# Patient Record
Sex: FEMALE | Race: WHITE
[De-identification: names, ages, dates, MRNs, and addresses within clinical notes are randomized per-mention and may not be internally consistent; named-entity substitution may affect disease eponyms.]

---

## 2018-07-06 ENCOUNTER — HOSPITAL ENCOUNTER (OUTPATIENT)
Age: 63
End: 2018-07-06
Payer: COMMERCIAL

## 2018-07-06 DIAGNOSIS — D18.09: ICD-10-CM

## 2018-07-06 DIAGNOSIS — M54.14: Primary | ICD-10-CM

## 2018-07-06 DIAGNOSIS — M51.34: ICD-10-CM

## 2018-07-06 PROCEDURE — 72146 MRI CHEST SPINE W/O DYE: CPT

## 2018-07-06 NOTE — DI.MRI.S_ITS
PROCEDURE:  MR THORACIC SPINE WO CON  
   
INDICATIONS:  Thoracic radiculopathy  
   
TECHNIQUE:    
Noncontrast sagittal T1 spine echo and T2 fast spin echo, sagittal STIR, axial T1 and T2   
fast spin echo through the thoracic spine.    
   
COMPARISON:  [facility], MR, MR CERVICAL SPINE WITHOUT   
CONTRAST, 3/13/2018, 10:24.  
   
FINDINGS:    
Image quality:  Excellent.    
   
Alignment and Curvature:  There is normal bony alignment except for slight levoscoliosis   
centered at the lower thoracic spine.    
   
Bone Marrow:  Marrow is of normal overall signal except at T2 where an ovoid centrally   
positioned vertebral body presumed hemangioma is again seen previously present on   
cervical MR scanning 3/13/18.  No acute vertebral body compression fractures.  There is   
mild to moderate degenerative disc disease along the thoracic spine, but no significant   
spinal or foraminal stenosis is associated.  Degenerative disc disease is most prominent   
at T8-9 with a mild degree of adjacent edema in the vertebral endplates and anterior   
subcortical marrow space as a result.  
   
Spinal Cord:  Visualized spinal cord is normal in size and signal.    
   
Paraspinous Soft Tissues:  No paravertebral masses.    
   
Miscellaneous:  On axial images, central canal and foramina appear widely patent at all   
scanned levels.    
   
IMPRESSION: Again noted is a T2 vertebral body hemangioma centrally positioned within the   
T2 marrow space, present on prior cervical MR scanning that included that area.  
   
Along the cervical and visualized thoracic spine no worsening degenerative changes are   
seen from the area included during cervical MR scanning 3/13/18.  No compression fracture   
is found.  There is a mild inflammatory component of degenerative disc disease with a   
slight degree of associated marrow edema adjacent to the T8-T9 anterior vertebral   
endplates.  
   
No disc herniation is seen, and no paravertebral mass or inflammatory process is found.    
No suspicion for discitis or osteomyelitis.  
   
   
   
Dictated by:  Juan Jose Marin M.D. on 7/06/2018 at 11:04       
Approved by:  Juan Jose Marin M.D. on 7/06/2018 at 11:11

## 2018-08-10 ENCOUNTER — HOSPITAL ENCOUNTER (OUTPATIENT)
Age: 63
End: 2018-08-10
Payer: COMMERCIAL

## 2018-08-10 DIAGNOSIS — M51.37: ICD-10-CM

## 2018-08-10 DIAGNOSIS — M48.061: ICD-10-CM

## 2018-08-10 DIAGNOSIS — M51.36: Primary | ICD-10-CM

## 2018-08-10 DIAGNOSIS — M48.07: ICD-10-CM

## 2018-08-10 DIAGNOSIS — M54.5: ICD-10-CM

## 2018-08-10 PROCEDURE — 72148 MRI LUMBAR SPINE W/O DYE: CPT

## 2018-08-10 NOTE — DI.MRI.S_ITS
PROCEDURE:  MR LUMBAR SPINE WO CON  
   
INDICATIONS:  LOW BACK PAIN  
   
TECHNIQUE:    
Noncontrast sagittal T1 spin echo and T2 fast echo, sagittal STIR, axial T1 and T2 fast   
spin echo through the lumbar spine.  In cases with scoliosis, additional coronal T2 fast   
spin echo may be performed.    
   
COMPARISON:  [facility], MR, MR THORACIC SPINE WO CON, 7/06/2018, 10:01.  
   
FINDINGS:    
Image quality:  Excellent.    
   
Alignment and Curvature:  S-shaped scoliotic curvature is seen on  images.  Minimal   
retrolisthesis is seen at L1-L2 and L2-L3.  There is minimal anterolisthesis seen at   
L5-S1.    
   
Bone Marrow:  Marrow is of normal overall signal.  No acute vertebral body compression   
fractures.    
   
Spinal Cord:  Conus medullaris terminates at the T12-L1 level.  Visualized cord   
demonstrates normal signal and size.    
   
Paraspinous Soft Tissues:  No paravertebral masses.  Left peripelvic renal cysts are   
incidentally noted.   
   
T12-L1: At least moderate loss of disc height and disc signal can be seen. Mild   
generalized disc bulge is seen.  Mild facet joint hypertrophy is seen.  There is moderate   
right-sided and no significant left-sided neural foraminal narrowing seen.  No   
significant central canal narrowing is seen.    
   
L1-L2: At least moderate loss of disc height and disc signal are seen.  Mild loss of disc   
height is seen. Loss of disc signal is seen.  There is mild to moderate right-sided and   
moderate left-sided neural narrowing seen. Mild central canal narrowing is seen.    
   
L2-L3:  Moderate loss of disc height is seen.  Loss of disc signal is seen.  Mild   
generalized disc bulge is seen.  Mild facet joint hypertrophy is seen.  There is mild   
right-sided and minimal left-sided neural foraminal narrowing seen.  Mild central canal   
narrowing is seen.    
   
L3-L4: Moderate loss of disc height is seen.  Loss of disc signal is seen.  Mild to   
moderate disc bulge is seen.  Moderate bilateral neural foraminal narrowing is seen, left   
worse than right. Mild central canal narrowing is seen.    
   
L4-L5:  Mild loss of disc height is seen. Loss of disc signal is seen.  Mild generalized   
disc bulge is seen.  Moderate facet joint hypertrophy is seen.  No significant neural   
foraminal narrowing is seen. Mild central canal narrowing is seen.    
   
L5-S1: Mild to moderate loss of disc height and disc signal are seen.  Moderate disc   
bulge is seen.  Moderate to prominent facet hypertrophy is seen.  There is moderate   
right-sided and no significant left-sided neural foraminal narrowing seen.  Minimal   
central canal narrowing is seen.  
   
   
   
IMPRESSION: Multiple levels of lumbar spine degenerative change are seen, including   
moderate bilateral neural foraminal narrowing at L3-L4.  
   
Dictated by: Coy Peña M.D. on 8/10/2018 at 9:22       
Approved by: Coy Peña M.D. on 8/10/2018 at 9:30

## 2018-08-29 ENCOUNTER — HOSPITAL ENCOUNTER (OUTPATIENT)
Dept: HOSPITAL 73 - RAD | Age: 63
End: 2018-08-29
Payer: COMMERCIAL

## 2018-08-29 VITALS
SYSTOLIC BLOOD PRESSURE: 124 MMHG | RESPIRATION RATE: 18 BRPM | HEART RATE: 59 BPM | OXYGEN SATURATION: 99 % | DIASTOLIC BLOOD PRESSURE: 66 MMHG

## 2018-08-29 VITALS
DIASTOLIC BLOOD PRESSURE: 60 MMHG | SYSTOLIC BLOOD PRESSURE: 102 MMHG | OXYGEN SATURATION: 100 % | HEART RATE: 64 BPM | RESPIRATION RATE: 18 BRPM

## 2018-08-29 VITALS
HEART RATE: 63 BPM | RESPIRATION RATE: 16 BRPM | DIASTOLIC BLOOD PRESSURE: 60 MMHG | OXYGEN SATURATION: 100 % | SYSTOLIC BLOOD PRESSURE: 103 MMHG

## 2018-08-29 VITALS
SYSTOLIC BLOOD PRESSURE: 110 MMHG | OXYGEN SATURATION: 99 % | RESPIRATION RATE: 18 BRPM | HEART RATE: 64 BPM | DIASTOLIC BLOOD PRESSURE: 60 MMHG

## 2018-08-29 VITALS
DIASTOLIC BLOOD PRESSURE: 59 MMHG | SYSTOLIC BLOOD PRESSURE: 98 MMHG | RESPIRATION RATE: 16 BRPM | OXYGEN SATURATION: 100 % | HEART RATE: 67 BPM

## 2018-08-29 VITALS
OXYGEN SATURATION: 99 % | DIASTOLIC BLOOD PRESSURE: 59 MMHG | RESPIRATION RATE: 16 BRPM | SYSTOLIC BLOOD PRESSURE: 106 MMHG | HEART RATE: 59 BPM

## 2018-08-29 VITALS
OXYGEN SATURATION: 99 % | RESPIRATION RATE: 16 BRPM | DIASTOLIC BLOOD PRESSURE: 55 MMHG | HEART RATE: 60 BPM | SYSTOLIC BLOOD PRESSURE: 99 MMHG

## 2018-08-29 VITALS
HEART RATE: 62 BPM | OXYGEN SATURATION: 100 % | DIASTOLIC BLOOD PRESSURE: 58 MMHG | RESPIRATION RATE: 16 BRPM | SYSTOLIC BLOOD PRESSURE: 90 MMHG

## 2018-08-29 VITALS
OXYGEN SATURATION: 100 % | DIASTOLIC BLOOD PRESSURE: 65 MMHG | SYSTOLIC BLOOD PRESSURE: 105 MMHG | HEART RATE: 60 BPM | RESPIRATION RATE: 16 BRPM

## 2018-08-29 VITALS
OXYGEN SATURATION: 99 % | HEART RATE: 64 BPM | SYSTOLIC BLOOD PRESSURE: 125 MMHG | DIASTOLIC BLOOD PRESSURE: 73 MMHG | TEMPERATURE: 96.9 F | RESPIRATION RATE: 20 BRPM

## 2018-08-29 VITALS
DIASTOLIC BLOOD PRESSURE: 57 MMHG | HEART RATE: 61 BPM | RESPIRATION RATE: 16 BRPM | SYSTOLIC BLOOD PRESSURE: 97 MMHG | OXYGEN SATURATION: 99 %

## 2018-08-29 VITALS
OXYGEN SATURATION: 100 % | HEART RATE: 60 BPM | DIASTOLIC BLOOD PRESSURE: 59 MMHG | RESPIRATION RATE: 16 BRPM | SYSTOLIC BLOOD PRESSURE: 113 MMHG

## 2018-08-29 VITALS
OXYGEN SATURATION: 99 % | DIASTOLIC BLOOD PRESSURE: 60 MMHG | SYSTOLIC BLOOD PRESSURE: 104 MMHG | RESPIRATION RATE: 16 BRPM | HEART RATE: 61 BPM

## 2018-08-29 DIAGNOSIS — M54.16: ICD-10-CM

## 2018-08-29 DIAGNOSIS — M51.36: Primary | ICD-10-CM

## 2018-08-29 PROCEDURE — 99152 MOD SED SAME PHYS/QHP 5/>YRS: CPT

## 2018-08-29 PROCEDURE — 64483 NJX AA&/STRD TFRM EPI L/S 1: CPT

## 2018-08-29 RX ADMIN — MIDAZOLAM HYDROCHLORIDE 5 MG: 1 INJECTION, SOLUTION INTRAMUSCULAR; INTRAVENOUS at 08:33

## 2018-08-29 RX ADMIN — BUPIVACAINE HYDROCHLORIDE 2 ML: 2.5 INJECTION, SOLUTION EPIDURAL; INFILTRATION; INTRACAUDAL; PERINEURAL at 08:45

## 2018-08-29 RX ADMIN — IOPAMIDOL 3 ML: 612 INJECTION, SOLUTION INTRATHECAL at 08:44

## 2018-08-29 RX ADMIN — DEXAMETHASONE SODIUM PHOSPHATE 20 MG: 10 INJECTION INTRAMUSCULAR; INTRAVENOUS at 08:45

## 2018-08-29 NOTE — P.PCN_ITS
"Procedures    
Date/Time    
Date of procedure: 08/29/18    
Time of procedure: 08:10    
General    
Procedure description:     
    
PROVIDER:   Al Marcus DO    
    
Operative Note    
    
PREOP DIAGNOSIS    
1.  FORAMINAL STENOSIS WITH LE SYMPTOMS,     
    
POST OP DIAGNOSIS    
1.  FORAMINAL STENOSIS WITH LE SYMPTOMS,     
    
PROCEDURES    
1.  FLUOROSCOPICALLY GUIDED CONTRAST CONTROLLED TRANSFORAMINAL EPIDURAL STEROID   
INJECTION - RIGHT L1/2 TFESI    
    
SURGEON: Al Marcus,     
    
INDICATIONS    
Leonor is referred by Dr. Lombardo for treatment of Foraminal Stenosis with   
right LE Symptoms    
    
FINDINGS    
Foraminal Nerve Root Compression secondary to disc disease and facet hypertrophy    
    
DESCRIPTION OF PROCEDURE    
Following denial of allergy and review of potential side effects and   
complications, including, but not necessarily limited to, infection, allergic   
reaction, local tissue breakdown, stroke, temporary or permanent nerve injury,   
paralysis, and possible death, the patient indicated that the patient   
understood and agreed to proceed.  An informed consent document was signed by   
the patient, witnessed by a nurse, and placed in the patient's chart.    
Additionally, other treatment options including medications, modalities, and   
physical therapy were reviewed with the patient.    
    
After review of previous anaesthesic history and IV conscious sedation the   
patient was deemed safe to proceed with todays procedure with IV conscious   
sedation as ASA class II designation. Safety time-out was performed to confirm   
patient ID, procedure to be performed and site of procedure. IV sedation was   
accomplished with a combination of 3mg was administered by the RN after DO order  
, titrated to patient comfort during the course of the procedure while the   
patient remained responsive to all verbal commands    
    
In the prone position following sterile prep and drape of the lumbar region,   
the right L1/2 posterior neuroforamen was identified fluoroscopically.  The   
skin was anesthetized via a 25-gauge 1.5-inch needle with 1% lidocaine   
solution.  At this point, a 25-gauge 3.5-inch spinal needle was atraumatically   
introduced and advanced under fluoroscopic guidance through the posterior right   
L1/2 neuroforamen to approximately the anterior aspect of the canal.  Depth was   
confirmed on lateral view.  Following negative aspiration, injection of   
approximately 1.5 cc of Isovue 200 under live fluoroscopy in the AP view   
confirmed excellent flow along the nerve root, into the epidural space without   
vascular or intrathecal uptake observed      
    
Radiological data, including multiple fluoroscopic views of the lumbosacral   
spine, reveal a spinal needle at the right L1/2 posterior neuroforamen.    
Subsequent views show flow of contrast material flowing superiorly and   
inferiorly along the nerve root confirming epidural flow.      
    
Subsequently, a test dose of 1.5 cc of 1% lidocaine solution was administered   
and patient was observed for two minutes for signs or symptoms of complications  
, including abdominal pain, shortness of breath, bilateral upper or lower   
extremity weakness, nausea and vomiting, prior to steroid injection.  At this   
point, a total of 3 cc or 20 mg of dexamethasone and 80mg Depo medrol was   
injected without incident.      
    
The patient tolerated the procedure well without signs or symptoms of   
complications prior to transfer to the recovery area continued monitoring   
without incident.      
    
The patient was then transferred to the recovery area where they were observed   
for an appropriate time after the injection.  The patient reported a VAS score   
of 7 prior to the procedure and a post-procedure VAS of 0.      
    
Total Fluoroscopy Time: 24.2 seconds       
Total Conscious Sedation Time: 24min    
    
POST OP I
[id number]|2018-08-29 11:07:00|2018-08-31 12:22:00|DI.RAD.S_ITS|[facility]|Imaging|[id number]|"PROCEDURE:  PAIN L INTERLAMINAR/CAUDAL INJ

## 2018-08-29 NOTE — PM.PROC.1
"Procedures
Date/Time
Date of procedure: 08/29/18
Time of procedure: 08:10
General
Procedure description: 

PROVIDER: Al Marcus DO

Operative Note

PREOP DIAGNOSIS
1.  FORAMINAL STENOSIS WITH LE SYMPTOMS, 

POST OP DIAGNOSIS
1.  FORAMINAL STENOSIS WITH LE SYMPTOMS, 

PROCEDURES
1.  FLUOROSCOPICALLY GUIDED CONTRAST CONTROLLED TRANSFORAMINAL EPIDURAL STEROID INJECTION - RIGHT L1/2 TFESI

SURGEON: Al Marcus, 

INDICATIONS
Leonor is referred by Dr. Lombardo for treatment of Foraminal Stenosis with right LE Symptoms

FINDINGS
Foraminal Nerve Root Compression secondary to disc disease and facet hypertrophy

DESCRIPTION OF PROCEDURE
Following denial of allergy and review of potential side effects and complications, including, but not necessarily limited to, infection, allergic reaction, local tissue breakdown, stroke, temporary or permanent nerve injury, paralysis, and possible 
death, the patient indicated that the patient understood and agreed to proceed.  An informed consent document was signed by the patient, witnessed by a nurse, and placed in the patient's chart.  Additionally, other treatment options including 
medications, modalities, and physical therapy were reviewed with the patient.

After review of previous anaesthesic history and IV conscious sedation the patient was deemed safe to proceed with todays procedure with IV conscious sedation as ASA class II designation. Safety time-out was performed to confirm patient ID, 
procedure to be performed and site of procedure. IV sedation was accomplished with a combination of 3mg was administered by the RN after DO order, titrated to patient comfort during the course of the procedure while the patient remained responsive 
to all verbal commands

In the prone position following sterile prep and drape of the lumbar region, the right L1/2 posterior neuroforamen was identified fluoroscopically.  The skin was anesthetized via a 25-gauge 1.5-inch needle with 1% lidocaine solution.  At this point, 
a 25-gauge 3.5-inch spinal needle was atraumatically introduced and advanced under fluoroscopic guidance through the posterior right L1/2 neuroforamen to approximately the anterior aspect of the canal.  Depth was confirmed on lateral view.  
Following negative aspiration, injection of approximately 1.5 cc of Isovue 200 under live fluoroscopy in the AP view confirmed excellent flow along the nerve root, into the epidural space without vascular or intrathecal uptake observed  

Radiological data, including multiple fluoroscopic views of the lumbosacral spine, reveal a spinal needle at the right L1/2 posterior neuroforamen.  Subsequent views show flow of contrast material flowing superiorly and inferiorly along the nerve 
root confirming epidural flow.  

Subsequently, a test dose of 1.5 cc of 1% lidocaine solution was administered and patient was observed for two minutes for signs or symptoms of complications, including abdominal pain, shortness of breath, bilateral upper or lower extremity 
weakness, nausea and vomiting, prior to steroid injection.  At this point, a total of 3 cc or 20 mg of dexamethasone and 80mg Depo medrol was injected without incident.  

The patient tolerated the procedure well without signs or symptoms of complications prior to transfer to the recovery area continued monitoring without incident.  

The patient was then transferred to the recovery area where they were observed for an appropriate time after the injection.  The patient reported a VAS score of 7 prior to the procedure and a post-procedure VAS of 0.  

Total Fluoroscopy Time: 24.2 seconds   
Total Conscious Sedation Time: 24min

POST OP INSTRUCTIONS
The patient was provided a Pain Log to continue to record their response to the target-specific procedure prior to follow-up visit with their referring physician. Additionally, specific post-injection care instructions and a contact number to our 
office were provided if concerns arise regarding pos
[id number]|2018-08-29 09:30:48|2018-08-29 09:30:48|PC.NURSE||||"Continues weak on right leg. Stays for observation"

## 2018-09-21 ENCOUNTER — HOSPITAL ENCOUNTER (OUTPATIENT)
Age: 63
End: 2018-09-21
Payer: COMMERCIAL

## 2018-09-21 DIAGNOSIS — M48.56XA: ICD-10-CM

## 2018-09-21 DIAGNOSIS — M43.16: ICD-10-CM

## 2018-09-21 DIAGNOSIS — M51.26: Primary | ICD-10-CM

## 2018-09-21 DIAGNOSIS — M47.816: ICD-10-CM

## 2018-09-21 DIAGNOSIS — M51.36: ICD-10-CM

## 2018-09-21 PROCEDURE — 72110 X-RAY EXAM L-2 SPINE 4/>VWS: CPT

## 2018-09-21 NOTE — DI.RAD.S_ITS
PROCEDURE:  XR LUMBAR SPINE MIN 4V  
   
INDICATIONS:  eval  
   
TECHNIQUE: 5 views of the lumbar spine were acquired.    
   
COMPARISON:  None.  
   
FINDINGS:    
   
Bones: There is L2 compression fracture with minimal height loss. Diffuse endplate   
spurring and sclerosis is seen. No other vertebral body fractures identified. Lower   
lumbar spine facet arthropathy. Grade 1 retrolisthesis of L1 on L2 and trace   
retrolisthesis of L3 on L4. Grade 1 anterolisthesis of L5 on S1. Moderate narrowing of   
the L5-S1 disc space as well as the L1-L2 and L2-L3 disc spaces. Bilateral mild hip joint   
degeneration. There is dextrocurvature centered at L3  
   
Soft tissues:  Overlying bowel gas pattern is normal.  No suspicious soft tissue   
calcifications.    
   
Oblique images:  No pars defects although evaluation is suboptimal due to arthritic   
changes.  
   
IMPRESSION:  
   
Mild L1 compression fracture.  
   
Diffuse multilevel lumbar disc degeneration as above.  
   
Grade 1 retrolisthesis of L1-L2.  
   
Lower lumbar facet arthropathy.  
   
   
   
Dictated by: Reinaldo Jimenez M.D. on 9/21/2018 at 14:24       
Approved by: Reinaldo Jimenez M.D. on 9/21/2018 at 14:27

## 2018-10-03 ENCOUNTER — HOSPITAL ENCOUNTER (OUTPATIENT)
Dept: HOSPITAL 73 - RAD | Age: 63
End: 2018-10-03
Payer: COMMERCIAL

## 2018-10-03 VITALS
DIASTOLIC BLOOD PRESSURE: 82 MMHG | SYSTOLIC BLOOD PRESSURE: 116 MMHG | HEART RATE: 75 BPM | RESPIRATION RATE: 18 BRPM | OXYGEN SATURATION: 98 %

## 2018-10-03 VITALS
SYSTOLIC BLOOD PRESSURE: 101 MMHG | OXYGEN SATURATION: 98 % | DIASTOLIC BLOOD PRESSURE: 67 MMHG | RESPIRATION RATE: 20 BRPM | HEART RATE: 80 BPM

## 2018-10-03 VITALS
DIASTOLIC BLOOD PRESSURE: 80 MMHG | HEART RATE: 70 BPM | OXYGEN SATURATION: 100 % | RESPIRATION RATE: 20 BRPM | TEMPERATURE: 96.9 F | SYSTOLIC BLOOD PRESSURE: 125 MMHG

## 2018-10-03 VITALS
RESPIRATION RATE: 16 BRPM | DIASTOLIC BLOOD PRESSURE: 98 MMHG | HEART RATE: 71 BPM | OXYGEN SATURATION: 100 % | SYSTOLIC BLOOD PRESSURE: 137 MMHG

## 2018-10-03 VITALS
HEART RATE: 74 BPM | SYSTOLIC BLOOD PRESSURE: 115 MMHG | RESPIRATION RATE: 18 BRPM | DIASTOLIC BLOOD PRESSURE: 75 MMHG | OXYGEN SATURATION: 100 %

## 2018-10-03 VITALS
SYSTOLIC BLOOD PRESSURE: 104 MMHG | OXYGEN SATURATION: 98 % | RESPIRATION RATE: 20 BRPM | DIASTOLIC BLOOD PRESSURE: 61 MMHG | HEART RATE: 75 BPM

## 2018-10-03 VITALS
HEART RATE: 76 BPM | RESPIRATION RATE: 20 BRPM | DIASTOLIC BLOOD PRESSURE: 64 MMHG | OXYGEN SATURATION: 99 % | SYSTOLIC BLOOD PRESSURE: 98 MMHG

## 2018-10-03 VITALS
OXYGEN SATURATION: 98 % | SYSTOLIC BLOOD PRESSURE: 106 MMHG | DIASTOLIC BLOOD PRESSURE: 65 MMHG | HEART RATE: 89 BPM | RESPIRATION RATE: 20 BRPM

## 2018-10-03 VITALS
DIASTOLIC BLOOD PRESSURE: 76 MMHG | HEART RATE: 72 BPM | OXYGEN SATURATION: 100 % | SYSTOLIC BLOOD PRESSURE: 110 MMHG | RESPIRATION RATE: 16 BRPM

## 2018-10-03 VITALS
DIASTOLIC BLOOD PRESSURE: 62 MMHG | HEART RATE: 76 BPM | OXYGEN SATURATION: 98 % | SYSTOLIC BLOOD PRESSURE: 104 MMHG | RESPIRATION RATE: 18 BRPM

## 2018-10-03 VITALS
SYSTOLIC BLOOD PRESSURE: 105 MMHG | OXYGEN SATURATION: 99 % | HEART RATE: 67 BPM | DIASTOLIC BLOOD PRESSURE: 62 MMHG | RESPIRATION RATE: 16 BRPM

## 2018-10-03 VITALS
SYSTOLIC BLOOD PRESSURE: 101 MMHG | DIASTOLIC BLOOD PRESSURE: 65 MMHG | HEART RATE: 75 BPM | RESPIRATION RATE: 16 BRPM | OXYGEN SATURATION: 99 %

## 2018-10-03 DIAGNOSIS — M48.061: Primary | ICD-10-CM

## 2018-10-03 DIAGNOSIS — M51.16: ICD-10-CM

## 2018-10-03 PROCEDURE — 64483 NJX AA&/STRD TFRM EPI L/S 1: CPT

## 2018-10-03 PROCEDURE — 99152 MOD SED SAME PHYS/QHP 5/>YRS: CPT

## 2018-10-03 RX ADMIN — IOPAMIDOL 3 ML: 612 INJECTION, SOLUTION INTRATHECAL at 11:59

## 2018-10-03 RX ADMIN — DEXAMETHASONE SODIUM PHOSPHATE 20 MG: 10 INJECTION INTRAMUSCULAR; INTRAVENOUS at 11:59

## 2018-10-03 RX ADMIN — MIDAZOLAM HYDROCHLORIDE 5 MG: 1 INJECTION, SOLUTION INTRAMUSCULAR; INTRAVENOUS at 11:51

## 2018-10-03 RX ADMIN — BUPIVACAINE HYDROCHLORIDE 2 ML: 2.5 INJECTION, SOLUTION EPIDURAL; INFILTRATION; INTRACAUDAL; PERINEURAL at 11:58

## 2018-10-03 NOTE — DI.RAD.S_ITS
PROCEDURE:  PAIN L/S TRANSFORAMINAL INJECT  
   
INDICATIONS:  INTERVERTEBRAL DISC DISPLACEMENT  
   
FINDINGS:    
Fluoroscopic spot filming was performed to verify placement of spinal needles at the   
right L3-4 intervertebral neural foramen, as labeled on the films.  Appropriate   
location(s) of the needle tip(s) was confirmed by injection of iodinated contrast.    
   
IMPRESSION: Successful needle tip localization for transforaminal epidural steroid   
injection on the right at the L3-4 neural foramen.  
   
   
   
Dictated by: Juan Jose Marin M.D. on 10/03/2018 at 15:41       
Approved by: Juan Jose Marin M.D. on 10/03/2018 at 15:41

## 2018-10-03 NOTE — PM.PROC.1
"Procedures
Date/Time
Date of procedure: 10/03/18
Time of procedure: 12:10
General
Procedure description: 

PROVIDER: Al Marcus DO

Operative Note

PREOP DIAGNOSIS
1.  FORAMINAL STENOSIS WITH LE SYMPTOMS, 

POST OP DIAGNOSIS
1.  FORAMINAL STENOSIS WITH LE SYMPTOMS, 

PROCEDURES
1.  FLUOROSCOPICALLY GUIDED CONTRAST CONTROLLED TRANSFORAMINAL EPIDURAL STEROID INJECTION - RIGHT L3/4 TFESI

SURGEON: Al Marcus, 

INDICATIONS
Leonor is referred by Dr. Lombardo for treatment of Foraminal Stenosis with right LE Symptoms

FINDINGS
Foraminal Nerve Root Compression secondary to disc disease and facet hypertrophy

DESCRIPTION OF PROCEDURE
Following denial of allergy and review of potential side effects and complications, including, but not necessarily limited to, infection, allergic reaction, local tissue breakdown, stroke, temporary or permanent nerve injury, paralysis, and possible 
death, the patient indicated that the patient understood and agreed to proceed.  An informed consent document was signed by the patient, witnessed by a nurse, and placed in the patient's chart.  Additionally, other treatment options including 
medications, modalities, and physical therapy were reviewed with the patient.

After review of previous anaesthesic history and IV conscious sedation the patient was deemed safe to proceed with todays procedure with IV conscious sedation as ASA class II designation. Safety time-out was performed to confirm patient ID, 
procedure to be performed and site of procedure. IV sedation was accomplished with a combination of 3mg was administered by the RN after DO order, titrated to patient comfort during the course of the procedure while the patient remained responsive 
to all verbal commands

In the prone position following sterile prep and drape of the lumbar region, the right L3/4 posterior neuroforamen was identified fluoroscopically.  The skin was anesthetized via a 25-gauge 1.5-inch needle with 1% lidocaine solution.  At this point, 
a 25-gauge 3.5-inch spinal needle was atraumatically introduced and advanced under fluoroscopic guidance through the posterior right L3/4 neuroforamen to approximately the anterior aspect of the canal.  Depth was confirmed on lateral view.  
Following negative aspiration, injection of approximately 1.5 cc of Isovue 200 under live fluoroscopy in the AP view confirmed excellent flow along the nerve root, into the epidural space without vascular or intrathecal uptake observed  

Radiological data, including multiple fluoroscopic views of the lumbosacral spine, reveal a spinal needle at the right L3/4 posterior neuroforamen.  Subsequent views show flow of contrast material flowing superiorly and inferiorly along the nerve 
root confirming epidural flow.  

Subsequently, a test dose of 1.5 cc of 1% lidocaine solution was administered and patient was observed for two minutes for signs or symptoms of complications, including abdominal pain, shortness of breath, bilateral upper or lower extremity 
weakness, nausea and vomiting, prior to steroid injection.  At this point, a total of 3 cc or 20 mg of dexamethasone and 80mg Depo medrol was injected without incident.  

The patient tolerated the procedure well without signs or symptoms of complications prior to transfer to the recovery area continued monitoring without incident.  
The patient was then transferred to the recovery area where they were observed for an appropriate time after the injection.  The patient reported a VAS score of 7 prior to the procedure and a post-procedure VAS of 0.  

Total Fluoroscopy Time: 24.2 seconds   
Total Conscious Sedation Time: 24min

POST OP INSTRUCTIONS
The patient was provided a Pain Log to continue to record their response to the target-specific procedure prior to follow-up visit with their referring physician. Additionally, specific post-injection care instructions and a contact number to our 
office were provided if concerns arise regarding possi
[id number]|2018-10-03 12:12:00|2018-10-03 12:10:00|ROCÍO_ITS|[facility]|Health Information Management|[id number]|"Procedures

## 2019-03-26 ENCOUNTER — HOSPITAL ENCOUNTER (OUTPATIENT)
Dept: HOSPITAL 73 - RAD | Age: 64
End: 2019-03-26
Payer: COMMERCIAL

## 2019-03-26 ENCOUNTER — HOSPITAL ENCOUNTER (OUTPATIENT)
Age: 64
End: 2019-03-26
Payer: COMMERCIAL

## 2019-03-26 VITALS
DIASTOLIC BLOOD PRESSURE: 81 MMHG | HEART RATE: 73 BPM | SYSTOLIC BLOOD PRESSURE: 111 MMHG | OXYGEN SATURATION: 97 % | RESPIRATION RATE: 16 BRPM

## 2019-03-26 VITALS
DIASTOLIC BLOOD PRESSURE: 70 MMHG | OXYGEN SATURATION: 97 % | HEART RATE: 74 BPM | SYSTOLIC BLOOD PRESSURE: 115 MMHG | RESPIRATION RATE: 18 BRPM

## 2019-03-26 VITALS
DIASTOLIC BLOOD PRESSURE: 65 MMHG | RESPIRATION RATE: 18 BRPM | HEART RATE: 69 BPM | OXYGEN SATURATION: 97 % | SYSTOLIC BLOOD PRESSURE: 111 MMHG

## 2019-03-26 VITALS
DIASTOLIC BLOOD PRESSURE: 57 MMHG | OXYGEN SATURATION: 98 % | RESPIRATION RATE: 16 BRPM | HEART RATE: 71 BPM | SYSTOLIC BLOOD PRESSURE: 99 MMHG

## 2019-03-26 VITALS
SYSTOLIC BLOOD PRESSURE: 119 MMHG | OXYGEN SATURATION: 99 % | DIASTOLIC BLOOD PRESSURE: 71 MMHG | HEART RATE: 74 BPM | RESPIRATION RATE: 18 BRPM

## 2019-03-26 VITALS
HEART RATE: 71 BPM | OXYGEN SATURATION: 96 % | RESPIRATION RATE: 18 BRPM | SYSTOLIC BLOOD PRESSURE: 128 MMHG | DIASTOLIC BLOOD PRESSURE: 76 MMHG | TEMPERATURE: 99.14 F

## 2019-03-26 VITALS
HEART RATE: 72 BPM | SYSTOLIC BLOOD PRESSURE: 113 MMHG | RESPIRATION RATE: 18 BRPM | DIASTOLIC BLOOD PRESSURE: 64 MMHG | OXYGEN SATURATION: 98 %

## 2019-03-26 VITALS
SYSTOLIC BLOOD PRESSURE: 104 MMHG | RESPIRATION RATE: 16 BRPM | OXYGEN SATURATION: 99 % | DIASTOLIC BLOOD PRESSURE: 55 MMHG | HEART RATE: 67 BPM

## 2019-03-26 VITALS
HEART RATE: 81 BPM | SYSTOLIC BLOOD PRESSURE: 109 MMHG | DIASTOLIC BLOOD PRESSURE: 65 MMHG | OXYGEN SATURATION: 99 % | RESPIRATION RATE: 18 BRPM

## 2019-03-26 DIAGNOSIS — Z80.3: ICD-10-CM

## 2019-03-26 DIAGNOSIS — M51.16: ICD-10-CM

## 2019-03-26 DIAGNOSIS — M48.061: Primary | ICD-10-CM

## 2019-03-26 DIAGNOSIS — Z12.31: Primary | ICD-10-CM

## 2019-03-26 PROCEDURE — 77067 SCR MAMMO BI INCL CAD: CPT

## 2019-03-26 PROCEDURE — 64483 NJX AA&/STRD TFRM EPI L/S 1: CPT

## 2019-03-26 PROCEDURE — 77063 BREAST TOMOSYNTHESIS BI: CPT

## 2019-03-26 PROCEDURE — 99152 MOD SED SAME PHYS/QHP 5/>YRS: CPT

## 2019-03-26 RX ADMIN — MIDAZOLAM HYDROCHLORIDE 5 MG: 1 INJECTION, SOLUTION INTRAMUSCULAR; INTRAVENOUS at 14:57

## 2019-03-26 RX ADMIN — BUPIVACAINE HYDROCHLORIDE 2 ML: 2.5 INJECTION, SOLUTION EPIDURAL; INFILTRATION; INTRACAUDAL; PERINEURAL at 15:01

## 2019-03-26 RX ADMIN — IOPAMIDOL 3 ML: 612 INJECTION, SOLUTION INTRATHECAL at 15:02

## 2019-03-26 RX ADMIN — DEXAMETHASONE SODIUM PHOSPHATE 20 MG: 10 INJECTION INTRAMUSCULAR; INTRAVENOUS at 15:02

## 2019-03-26 NOTE — DI.MG.S_ITS
BILATERAL DIGITAL SCREENING MAMMOGRAM 3D/2D WITH CAD: 3/26/2019  
CLINICAL: Routine screening. Family history of breast cancer.    
   
Comparison is made to exams dated:  3/12/2018 mammogram, 2/14/2017 mammogram, and   
1/18/2016 mammogram - [facility].  There are scattered fibroglandular elements in   
both breasts.    
   
Current study was also evaluated with a Computer Aided Detection (CAD) system.    
No significant masses, calcifications, or other findings are seen in either breast.    
There are linear scar markers overlying the breasts bilaterally.  
There has been no significant interval change.  
   
IMPRESSION: NEGATIVE  
There is no mammographic evidence of malignancy. A 1 year screening mammogram is   
recommended.     
   
This exam was interpreted at Station ID: [id number].    
   
NOTE: For mammograms, a report in lay terms will be sent to the patient. Approximately   
15% of breast malignancies will not be visualized mammographically. In the management of   
a palpable breast mass, a negative mammogram must not discourage biopsy of a clinically   
suspicious lesion.  
   
Electronically Signed By: Dom Foreman M.D.            
ecl/:3/26/2019 17:38:44    
   
   
letter sent: Normal Exam    
ACR BI-RADS Category 1: Negative [id number]

## 2019-03-26 NOTE — PM.PROC.1
"Procedures
Date/Time
Date of procedure: 03/26/19
Time of procedure: 15:10
General
Procedure description: 

PROVIDER: Al Marcus DO

Operative Note

PREOP DIAGNOSIS
1.  FORAMINAL STENOSIS WITH LE SYMPTOMS, 

POST OP DIAGNOSIS
1.  FORAMINAL STENOSIS WITH LE SYMPTOMS, 

PROCEDURES
1.  FLUOROSCOPICALLY GUIDED CONTRAST CONTROLLED TRANSFORAMINAL EPIDURAL STEROID INJECTION - LEFT L3/4 TFESI

SURGEON: Al Marcus, 

INDICATIONS
Leonor is referred by Dr. Grover for treatment of Foraminal Stenosis with left LE Symptoms

FINDINGS
Foraminal Nerve Root Compression secondary to disc disease and facet hypertrophy.

DESCRIPTION OF PROCEDURE
Following denial of allergy and review of potential side effects and complications, including, but not necessarily limited to, infection, allergic reaction, local tissue breakdown, stroke, temporary or permanent nerve injury, paralysis, and possible 
death, the patient indicated that the patient understood and agreed to proceed.  An informed consent document was signed by the patient, witnessed by a nurse, and placed in the patient's chart.  Additionally, other treatment options including 
medications, modalities, and physical therapy were reviewed with the patient.

After review of previous anaesthesic history and IV conscious sedation the patient was deemed safe to proceed with todays procedure with IV conscious sedation as ASA class II designation. Safety time-out was performed to confirm patient ID, 
procedure to be performed and site of procedure. IV sedation was accomplished with a combination of 3mg of Versed was administered by the RN after DO order, titrated to patient comfort during the course of the procedure while the patient remained 
responsive to all verbal commands

In the prone position following sterile prep and drape of the lumbar region, the left L3/4 posterior neuroforamen was identified fluoroscopically.  The skin was anesthetized via a 25-gauge 1.5-inch needle with 1% lidocaine solution.  At this point, 
a 25-gauge 3.5-inch spinal needle was atraumatically introduced and advanced under fluoroscopic guidance through the posterior left L3/4 neuroforamen to approximately the anterior aspect of the canal.  Depth was confirmed on lateral view.  Following 
negative aspiration, injection of approximately 1.5 cc of Isovue 200 under live fluoroscopy in the AP view confirmed excellent flow along the nerve root, into the epidural space without vascular or intrathecal uptake observed  

Radiological data, including multiple fluoroscopic views of the lumbosacral spine, reveal a spinal needle at the left L3/4 posterior neuroforamen.  Subsequent views show flow of contrast material flowing superiorly and inferiorly along the nerve 
root confirming epidural flow.  

Subsequently, a test dose of 1.5 cc of 1% lidocaine solution was administered and patient was observed for two minutes for signs or symptoms of complications, including abdominal pain, shortness of breath, bilateral upper or lower extremity 
weakness, nausea and vomiting, prior to steroid injection.  At this point, a total of 2cc or 20mg of dexamethasone was injected without incident.  

The patient tolerated the procedure well without signs or symptoms of complications prior to transfer to the recovery area continued monitoring without incident.  
The patient was then transferred to the recovery area where they were observed for an appropriate time after the injection.  The patient reported a VAS score of 7 prior to the procedure and a post-procedure VAS of 0.  

Total Fluoroscopy Time: 24.2 seconds   
Total Conscious Sedation Time: 24min

POST OP INSTRUCTIONS
The patient was provided a Pain Log to continue to record their response to the target-specific procedure prior to follow-up visit with their referring physician. Additionally, specific post-injection care instructions and a contact number to our 
office were provided if concerns arise regarding possible complications
[id number]|2019-03-26 15:29:51|2019-03-26 15:29:51|PC.NURSE||||"pt returned via wheelchair awake and alert.  Able to move from w/c to chair with standby assist.  Resumed monitoring from Danica CROWELL. "

## 2019-03-26 NOTE — P.PCN_ITS
"Procedures    
Date/Time    
Date of procedure: 03/26/19    
Time of procedure: 15:10    
General    
Procedure description:     
    
PROVIDER:   Al Marcus DO    
    
Operative Note    
    
PREOP DIAGNOSIS    
1.  FORAMINAL STENOSIS WITH LE SYMPTOMS,     
    
POST OP DIAGNOSIS    
1.  FORAMINAL STENOSIS WITH LE SYMPTOMS,     
    
PROCEDURES    
1.  FLUOROSCOPICALLY GUIDED CONTRAST CONTROLLED TRANSFORAMINAL EPIDURAL STEROID   
INJECTION - LEFT L3/4 TFESI    
    
SURGEON: Al Marcus,     
    
INDICATIONS    
Leonor is referred by Dr. Grover for treatment of Foraminal Stenosis with left   
LE Symptoms    
    
FINDINGS    
Foraminal Nerve Root Compression secondary to disc disease and facet   
hypertrophy.    
    
DESCRIPTION OF PROCEDURE    
Following denial of allergy and review of potential side effects and   
complications, including, but not necessarily limited to, infection, allergic   
reaction, local tissue breakdown, stroke, temporary or permanent nerve injury,   
paralysis, and possible death, the patient indicated that the patient understood  
and agreed to proceed.  An informed consent document was signed by the patient,   
witnessed by a nurse, and placed in the patient's chart.  Additionally, other   
treatment options including medications, modalities, and physical therapy were   
reviewed with the patient.    
    
After review of previous anaesthesic history and IV conscious sedation the   
patient was deemed safe to proceed with todays procedure with IV conscious   
sedation as ASA class II designation. Safety time-out was performed to confirm   
patient ID, procedure to be performed and site of procedure. IV sedation was   
accomplished with a combination of 3mg of Versed was administered by the RN   
after DO order, titrated to patient comfort during the course of the procedure   
while the patient remained responsive to all verbal commands    
    
In the prone position following sterile prep and drape of the lumbar region, the  
left L3/4 posterior neuroforamen was identified fluoroscopically.  The skin was   
anesthetized via a 25-gauge 1.5-inch needle with 1% lidocaine solution.  At this  
point, a 25-gauge 3.5-inch spinal needle was atraumatically introduced and   
advanced under fluoroscopic guidance through the posterior left L3/4   
neuroforamen to approximately the anterior aspect of the canal.  Depth was   
confirmed on lateral view.  Following negative aspiration, injection of   
approximately 1.5 cc of Isovue 200 under live fluoroscopy in the AP view   
confirmed excellent flow along the nerve root, into the epidural space without   
vascular or intrathecal uptake observed      
    
Radiological data, including multiple fluoroscopic views of the lumbosacral   
spine, reveal a spinal needle at the left L3/4 posterior neuroforamen.    
Subsequent views show flow of contrast material flowing superiorly and   
inferiorly along the nerve root confirming epidural flow.      
    
Subsequently, a test dose of 1.5 cc of 1% lidocaine solution was administered   
and patient was observed for two minutes for signs or symptoms of complications,  
including abdominal pain, shortness of breath, bilateral upper or lower   
extremity weakness, nausea and vomiting, prior to steroid injection.  At this   
point, a total of 2cc or 20mg of dexamethasone was injected without incident.      
    
The patient tolerated the procedure well without signs or symptoms of   
complications prior to transfer to the recovery area continued monitoring   
without incident.      
The patient was then transferred to the recovery area where they were observed   
for an appropriate time after the injection.  The patient reported a VAS score   
of 7 prior to the procedure and a post-procedure VAS of 0.      
    
Total Fluoroscopy Time: 24.2 seconds       
Total Conscious Sedation Time: 24min    
    
POST OP INSTRUCTIONS    
The [facility]ryan
[id number]|2019-03-26 10:47:00|2019-03-26 16:36:00|DI.RAD.S_ITS|[facility]|Imaging|[id number]|"PROCEDURE:  PAIN L/S TRANSFORAMINAL INJECT

## 2019-03-27 NOTE — PC.NURSE
Follow up call made and pt reports doing well today, said she slept most of yesterday after procedure, but was also able to sleep through the night.  She still has a little  hiccup  in her left hip if she moves just the right way. Otherwise she is 
doing great.

## 2020-01-01 NOTE — DI.RAD.S_ITS
PROCEDURE:  PAIN L/S TRANSFORAMINAL INJECT  
   
INDICATIONS:  HNP with right lower extremity radicular  
   
FINDINGS:    
Fluoroscopic spot filming was performed to verify placement of spinal needles at the   
L1-L2 level(s), as labeled on the films.  Appropriate location(s) of the needle tip(s)   
was confirmed by injection of iodinated contrast.    
   
IMPRESSION: Successful transforaminal epidural steroid injection localization position.  
   
   
   
Dictated by:  Juan Jose Marin M.D. on 8/31/2018 at 11:35       
Approved by:  Juan Jose Marin M.D. on 8/31/2018 at 11:50 PILO

## 2021-01-13 ENCOUNTER — HOSPITAL ENCOUNTER (OUTPATIENT)
Age: 66
End: 2021-01-13
Payer: COMMERCIAL

## 2021-01-13 DIAGNOSIS — M51.04: ICD-10-CM

## 2021-01-13 DIAGNOSIS — M48.02: ICD-10-CM

## 2021-01-13 DIAGNOSIS — M54.14: ICD-10-CM

## 2021-01-13 DIAGNOSIS — M41.80: ICD-10-CM

## 2021-01-13 DIAGNOSIS — M41.84: Primary | ICD-10-CM

## 2021-01-13 DIAGNOSIS — M51.34: ICD-10-CM

## 2021-01-13 DIAGNOSIS — M51.26: ICD-10-CM

## 2021-01-13 PROCEDURE — 72072 X-RAY EXAM THORAC SPINE 3VWS: CPT

## 2021-01-13 PROCEDURE — 99214 OFFICE O/P EST MOD 30 MIN: CPT

## 2021-01-13 NOTE — DI.RAD.S_ITS
PROCEDURE:  XR THORACIC SPINE 3V  
   
INDICATIONS:  thoracic radiculopathy  
   
TECHNIQUE:  3 views of the thoracic spine were acquired.    
   
COMPARISON:  None.  
   
FINDINGS:    
   
Bones:  No fractures or dislocations.  No suspicious bony lesions.  There is   
mild-to-moderate kyphosis centered at T7-8 level.  Mild to moderate leftward scoliosis of   
lower thoracic spine centered at T10-11 level is also seen.  Degenerative endplate   
changes are noted throughout mid to lower thoracic spine.  12 pairs of ribs are noted,   
and appear intact where visualized.    
   
Soft tissues:  No paravertebral stripe thickening.    
   
IMPRESSION:  Scoliosis and kyphosis of thoracic spine as above.  No acute compression   
fracture or spondylolisthesis.  Degenerative disc disease throughout mid to lower   
thoracic spine.  
   
   
Dictated by: Carlos Eduardo Caban M.D. on 1/13/2021 at 13:35       
Approved by: Carlos Eduardo Caban M.D. on 1/13/2021 at 13:47

## 2021-02-01 ENCOUNTER — HOSPITAL ENCOUNTER (OUTPATIENT)
Age: 66
End: 2021-02-01
Payer: COMMERCIAL

## 2021-02-01 DIAGNOSIS — Z20.822: Primary | ICD-10-CM

## 2021-02-01 PROCEDURE — 87635 SARS-COV-2 COVID-19 AMP PRB: CPT

## 2021-02-02 ENCOUNTER — HOSPITAL ENCOUNTER (OUTPATIENT)
Dept: HOSPITAL 73 - RAD | Age: 66
Discharge: HOME | End: 2021-02-02
Payer: COMMERCIAL

## 2021-02-02 VITALS
DIASTOLIC BLOOD PRESSURE: 64 MMHG | HEART RATE: 58 BPM | OXYGEN SATURATION: 98 % | RESPIRATION RATE: 12 BRPM | SYSTOLIC BLOOD PRESSURE: 125 MMHG

## 2021-02-02 VITALS
HEART RATE: 60 BPM | SYSTOLIC BLOOD PRESSURE: 110 MMHG | OXYGEN SATURATION: 95 % | RESPIRATION RATE: 18 BRPM | DIASTOLIC BLOOD PRESSURE: 60 MMHG

## 2021-02-02 VITALS
HEART RATE: 57 BPM | RESPIRATION RATE: 13 BRPM | SYSTOLIC BLOOD PRESSURE: 105 MMHG | DIASTOLIC BLOOD PRESSURE: 58 MMHG | OXYGEN SATURATION: 95 %

## 2021-02-02 VITALS
RESPIRATION RATE: 12 BRPM | SYSTOLIC BLOOD PRESSURE: 104 MMHG | OXYGEN SATURATION: 96 % | DIASTOLIC BLOOD PRESSURE: 57 MMHG | HEART RATE: 57 BPM

## 2021-02-02 VITALS
HEART RATE: 62 BPM | DIASTOLIC BLOOD PRESSURE: 62 MMHG | RESPIRATION RATE: 18 BRPM | SYSTOLIC BLOOD PRESSURE: 106 MMHG | OXYGEN SATURATION: 95 %

## 2021-02-02 VITALS
RESPIRATION RATE: 12 BRPM | OXYGEN SATURATION: 95 % | SYSTOLIC BLOOD PRESSURE: 105 MMHG | DIASTOLIC BLOOD PRESSURE: 57 MMHG | HEART RATE: 56 BPM

## 2021-02-02 VITALS
HEART RATE: 65 BPM | DIASTOLIC BLOOD PRESSURE: 60 MMHG | OXYGEN SATURATION: 98 % | RESPIRATION RATE: 18 BRPM | SYSTOLIC BLOOD PRESSURE: 128 MMHG | TEMPERATURE: 97.88 F

## 2021-02-02 VITALS
DIASTOLIC BLOOD PRESSURE: 54 MMHG | OXYGEN SATURATION: 98 % | RESPIRATION RATE: 20 BRPM | TEMPERATURE: 97 F | SYSTOLIC BLOOD PRESSURE: 108 MMHG | HEART RATE: 58 BPM

## 2021-02-02 VITALS
RESPIRATION RATE: 16 BRPM | DIASTOLIC BLOOD PRESSURE: 51 MMHG | OXYGEN SATURATION: 94 % | SYSTOLIC BLOOD PRESSURE: 101 MMHG | HEART RATE: 58 BPM

## 2021-02-02 VITALS
HEART RATE: 58 BPM | SYSTOLIC BLOOD PRESSURE: 128 MMHG | DIASTOLIC BLOOD PRESSURE: 60 MMHG | OXYGEN SATURATION: 100 % | RESPIRATION RATE: 18 BRPM

## 2021-02-02 DIAGNOSIS — M47.24: ICD-10-CM

## 2021-02-02 DIAGNOSIS — M51.14: ICD-10-CM

## 2021-02-02 DIAGNOSIS — M48.04: Primary | ICD-10-CM

## 2021-02-02 PROCEDURE — 99152 MOD SED SAME PHYS/QHP 5/>YRS: CPT

## 2021-02-02 PROCEDURE — 62321 NJX INTERLAMINAR CRV/THRC: CPT

## 2021-02-02 RX ADMIN — FENTANYL CITRATE 50 MCG: 50 INJECTION, SOLUTION INTRAMUSCULAR; INTRAVENOUS at 08:15

## 2021-02-02 RX ADMIN — DEXAMETHASONE SODIUM PHOSPHATE 30 MG: 10 INJECTION INTRAMUSCULAR; INTRAVENOUS at 08:22

## 2021-02-02 RX ADMIN — BUPIVACAINE HYDROCHLORIDE 2 ML: 2.5 INJECTION, SOLUTION EPIDURAL; INFILTRATION; INTRACAUDAL; PERINEURAL at 08:21

## 2021-02-02 RX ADMIN — MIDAZOLAM HYDROCHLORIDE 5 MG: 1 INJECTION, SOLUTION INTRAMUSCULAR; INTRAVENOUS at 08:15

## 2021-02-02 RX ADMIN — IOPAMIDOL 3 ML: 612 INJECTION, SOLUTION INTRATHECAL at 08:21

## 2021-02-02 NOTE — DI.RAD.S_ITS
PROCEDURE:  PAIN C/T INTERLAMINAR INJECT  
   
INDICATIONS:  SPONDYLOSIS  
   
COMPARISON:  None.  
   
FINDINGS:    
Fluoroscopic spot filming was performed to verify placement of spinal needles at the   
T9-T10 level(s), as labeled on the films.  Appropriate location(s) of the needle tip(s)   
was confirmed by injection of iodinated contrast.    
   
Dictated by: Reinaldo Jimenez M.D. on 2/02/2021 at 9:51       
Approved by: Reinaldo Jimenez M.D. on 2/02/2021 at 9:52

## 2021-02-02 NOTE — PM.PROC.IR.1
Date/Time/Diagnoses
Date of procedure: 02/02/21
Time of procedure: 08:42
Pre-procedure diagnosis: Thoracic stenosis with HNP 

Post-procedure diagnosis: same
Procedure Notes
Procedure: Fluoroscopic guided, contrast controlled T9/10 translaminar epidural steroid injection with conscious sedation.
Indications: Leonor is referred by Dr. Humphrey for treatment of thoracic DDD/DJD with radiculopathy
Physician: Al Marcus
Total Fluoroscopy time (seconds): 23
Total sedation minutes: 18
Complications: none
Procedure in detail & Post-procedure care: DESCRIPTION OF PROCEDURE
Fluoroscopic guided, contrast controlled T9/10 translaminar epidural steroid injection with conscious sedation.

Following review of allergy review potential side effects and complications, including, but not necessarily limited to, infection, allergic reaction, local tissue breakdown, temporary as well as permanent nerve injury, stroke, paralysis and possible 
death, the patient indicated that they understood and agreed to proceed.  An informed consent document was signed by the patient, witnessed by the nurse, and placed in the patient's chart.  Additionally other treatment options including modalities, 
medications and physical therapy were reviewed with the patient.

After review of previous anaesthesic history and IV conscious sedation the patient was deemed safe to proceed with today's procedure with IV conscious sedation as ASA class II designation. Safety time-out was performed to confirm patient ID, 
procedure to be performed and site of procedure. IV sedation was accomplished with a combination of 2mg of Versed and 50mcg of Fentanyl administered by the RN after DO order, titrated to patient comfort during the course of the procedure while the 
patient remained responsive to all verbal commands

In the prone position, following sterile prep and drape of the thoracic region the T9/10 translaminar space was identified fluoroscopically.  The skin was anesthetized via 25 gauge 1.5inch needle with 1% lidocaine solution.  At this point a 22gauge 
epidural needle was atraumatically introduced and advanced under fluoroscopic guidance into the region of the T9/10 translaminar space depth was confirmed on lateral view.

Radiographic data, including multiple fluoroscopic views of the thoracic spine, reveals spinal needle at the T9/10 translaminar space.  Lateral views then showed the placement of the needle in the epidural space.  Subsequent view show contrast 
material flowing superiorly and inferiorly in the epidural space. No vascular or intrathecal uptake is observed.

At this point using loss of resistance technique with saline and the epidural space was entered.  This was confirmed followed negative aspiration and injection of approximately 1.5cc of Isovue 200 showed excellent epidural flow without vascular or 
intrathecal uptake.  At this point, 1 cc of 1% lidocaine solution was admitted as a test dose and the patient was observed for an appropriate period of time without signs or symptoms of complications, including abdominal pain, shortness of breath, 
bilateral upper and lower extremity weakness, nausea and vomiting, prior to steroid injection. Subsequently, 3cc or 30mg of dexamethasone was then injected without incident.

The patient tolerated the procedure well without signs of complications and subsequently was transferred to the recovery room for further monitoring.
The patient was then transferred to the recovery area with their observed for an appropriate time after the injection.  Patient reported a VAS score of 7 prior to the procedure and post-procedure VAS of 2.

## 2021-03-31 ENCOUNTER — HOSPITAL ENCOUNTER (OUTPATIENT)
Age: 66
End: 2021-03-31
Payer: COMMERCIAL

## 2021-03-31 DIAGNOSIS — M47.817: ICD-10-CM

## 2021-03-31 DIAGNOSIS — M51.26: Primary | ICD-10-CM

## 2021-03-31 DIAGNOSIS — M41.87: ICD-10-CM

## 2021-03-31 DIAGNOSIS — M43.17: ICD-10-CM

## 2021-03-31 PROCEDURE — 72110 X-RAY EXAM L-2 SPINE 4/>VWS: CPT

## 2022-02-08 ENCOUNTER — HOSPITAL ENCOUNTER (OUTPATIENT)
Age: 67
End: 2022-02-08
Payer: COMMERCIAL

## 2022-02-08 DIAGNOSIS — Z20.822: Primary | ICD-10-CM

## 2022-02-08 PROCEDURE — 87635 SARS-COV-2 COVID-19 AMP PRB: CPT

## 2022-02-10 ENCOUNTER — HOSPITAL ENCOUNTER (OUTPATIENT)
Dept: HOSPITAL 73 - RAD | Age: 67
Discharge: HOME | End: 2022-02-10
Payer: COMMERCIAL

## 2022-02-10 VITALS
RESPIRATION RATE: 14 BRPM | DIASTOLIC BLOOD PRESSURE: 75 MMHG | SYSTOLIC BLOOD PRESSURE: 149 MMHG | OXYGEN SATURATION: 100 % | HEART RATE: 62 BPM

## 2022-02-10 VITALS
SYSTOLIC BLOOD PRESSURE: 121 MMHG | HEART RATE: 66 BPM | DIASTOLIC BLOOD PRESSURE: 63 MMHG | OXYGEN SATURATION: 96 % | RESPIRATION RATE: 12 BRPM

## 2022-02-10 VITALS
DIASTOLIC BLOOD PRESSURE: 62 MMHG | SYSTOLIC BLOOD PRESSURE: 118 MMHG | HEART RATE: 62 BPM | OXYGEN SATURATION: 97 % | RESPIRATION RATE: 12 BRPM

## 2022-02-10 VITALS
HEART RATE: 62 BPM | DIASTOLIC BLOOD PRESSURE: 63 MMHG | RESPIRATION RATE: 12 BRPM | SYSTOLIC BLOOD PRESSURE: 120 MMHG | OXYGEN SATURATION: 97 %

## 2022-02-10 VITALS
SYSTOLIC BLOOD PRESSURE: 129 MMHG | HEART RATE: 67 BPM | DIASTOLIC BLOOD PRESSURE: 63 MMHG | OXYGEN SATURATION: 98 % | RESPIRATION RATE: 12 BRPM

## 2022-02-10 VITALS
HEART RATE: 60 BPM | SYSTOLIC BLOOD PRESSURE: 111 MMHG | OXYGEN SATURATION: 99 % | DIASTOLIC BLOOD PRESSURE: 59 MMHG | RESPIRATION RATE: 12 BRPM

## 2022-02-10 VITALS
HEART RATE: 66 BPM | DIASTOLIC BLOOD PRESSURE: 76 MMHG | SYSTOLIC BLOOD PRESSURE: 118 MMHG | OXYGEN SATURATION: 97 % | RESPIRATION RATE: 12 BRPM

## 2022-02-10 VITALS
SYSTOLIC BLOOD PRESSURE: 122 MMHG | OXYGEN SATURATION: 99 % | HEART RATE: 59 BPM | RESPIRATION RATE: 12 BRPM | DIASTOLIC BLOOD PRESSURE: 60 MMHG

## 2022-02-10 VITALS
SYSTOLIC BLOOD PRESSURE: 132 MMHG | OXYGEN SATURATION: 98 % | HEART RATE: 67 BPM | DIASTOLIC BLOOD PRESSURE: 75 MMHG | RESPIRATION RATE: 16 BRPM | TEMPERATURE: 98.42 F

## 2022-02-10 VITALS
OXYGEN SATURATION: 98 % | HEART RATE: 62 BPM | DIASTOLIC BLOOD PRESSURE: 60 MMHG | RESPIRATION RATE: 14 BRPM | SYSTOLIC BLOOD PRESSURE: 122 MMHG

## 2022-02-10 DIAGNOSIS — M53.3: Primary | ICD-10-CM

## 2022-02-10 DIAGNOSIS — M46.1: ICD-10-CM

## 2022-02-10 PROCEDURE — 27096 INJECT SACROILIAC JOINT: CPT

## 2022-02-10 PROCEDURE — 99152 MOD SED SAME PHYS/QHP 5/>YRS: CPT

## 2022-02-10 RX ADMIN — FENTANYL CITRATE 50 MCG: 50 INJECTION, SOLUTION INTRAMUSCULAR; INTRAVENOUS at 15:25

## 2022-02-10 RX ADMIN — BUPIVACAINE HYDROCHLORIDE 5 ML: 5 INJECTION, SOLUTION EPIDURAL; INTRACAUDAL; PERINEURAL at 15:29

## 2022-02-10 RX ADMIN — BETAMETHASONE SODIUM PHOSPHATE AND BETAMETHASONE ACETATE 12 MG: 3; 3 INJECTION, SUSPENSION INTRA-ARTICULAR; INTRALESIONAL; INTRAMUSCULAR at 15:30

## 2022-02-10 RX ADMIN — IOPAMIDOL 3 ML: 612 INJECTION, SOLUTION INTRATHECAL at 15:29

## 2022-02-10 RX ADMIN — MIDAZOLAM HYDROCHLORIDE 5 MG: 1 INJECTION, SOLUTION INTRAMUSCULAR; INTRAVENOUS at 15:25

## 2022-04-04 ENCOUNTER — HOSPITAL ENCOUNTER (OUTPATIENT)
Age: 67
End: 2022-04-04
Payer: COMMERCIAL

## 2022-04-04 DIAGNOSIS — M51.37: ICD-10-CM

## 2022-04-04 DIAGNOSIS — M41.80: ICD-10-CM

## 2022-04-04 DIAGNOSIS — M48.061: ICD-10-CM

## 2022-04-04 DIAGNOSIS — M51.26: Primary | ICD-10-CM

## 2022-04-04 DIAGNOSIS — M47.817: ICD-10-CM

## 2022-04-04 DIAGNOSIS — M47.816: ICD-10-CM

## 2022-04-04 DIAGNOSIS — M51.36: ICD-10-CM

## 2022-04-04 DIAGNOSIS — M48.07: ICD-10-CM

## 2022-04-04 PROCEDURE — 72148 MRI LUMBAR SPINE W/O DYE: CPT

## 2022-08-08 ENCOUNTER — HOSPITAL ENCOUNTER (OUTPATIENT)
Age: 67
End: 2022-08-08
Payer: COMMERCIAL

## 2022-08-08 DIAGNOSIS — Z20.822: Primary | ICD-10-CM

## 2022-08-08 PROCEDURE — 87635 SARS-COV-2 COVID-19 AMP PRB: CPT

## 2022-08-09 ENCOUNTER — HOSPITAL ENCOUNTER (OUTPATIENT)
Age: 67
Discharge: HOME | End: 2022-08-09
Payer: COMMERCIAL

## 2022-08-09 VITALS
DIASTOLIC BLOOD PRESSURE: 72 MMHG | SYSTOLIC BLOOD PRESSURE: 151 MMHG | OXYGEN SATURATION: 99 % | HEART RATE: 65 BPM | TEMPERATURE: 97.2 F | RESPIRATION RATE: 20 BRPM

## 2022-08-09 VITALS
SYSTOLIC BLOOD PRESSURE: 116 MMHG | DIASTOLIC BLOOD PRESSURE: 61 MMHG | OXYGEN SATURATION: 98 % | RESPIRATION RATE: 18 BRPM | HEART RATE: 62 BPM

## 2022-08-09 VITALS
HEART RATE: 66 BPM | RESPIRATION RATE: 19 BRPM | OXYGEN SATURATION: 98 % | DIASTOLIC BLOOD PRESSURE: 66 MMHG | SYSTOLIC BLOOD PRESSURE: 120 MMHG

## 2022-08-09 VITALS
SYSTOLIC BLOOD PRESSURE: 122 MMHG | OXYGEN SATURATION: 97 % | DIASTOLIC BLOOD PRESSURE: 73 MMHG | HEART RATE: 67 BPM | RESPIRATION RATE: 20 BRPM

## 2022-08-09 VITALS
OXYGEN SATURATION: 100 % | RESPIRATION RATE: 18 BRPM | DIASTOLIC BLOOD PRESSURE: 67 MMHG | SYSTOLIC BLOOD PRESSURE: 116 MMHG | HEART RATE: 59 BPM

## 2022-08-09 VITALS
OXYGEN SATURATION: 100 % | RESPIRATION RATE: 17 BRPM | DIASTOLIC BLOOD PRESSURE: 78 MMHG | HEART RATE: 63 BPM | SYSTOLIC BLOOD PRESSURE: 151 MMHG

## 2022-08-09 VITALS
DIASTOLIC BLOOD PRESSURE: 68 MMHG | RESPIRATION RATE: 17 BRPM | HEART RATE: 63 BPM | SYSTOLIC BLOOD PRESSURE: 132 MMHG | OXYGEN SATURATION: 100 %

## 2022-08-09 VITALS
RESPIRATION RATE: 17 BRPM | SYSTOLIC BLOOD PRESSURE: 119 MMHG | HEART RATE: 62 BPM | OXYGEN SATURATION: 99 % | DIASTOLIC BLOOD PRESSURE: 68 MMHG

## 2022-08-09 DIAGNOSIS — M51.16: Primary | ICD-10-CM

## 2022-08-09 PROCEDURE — 99152 MOD SED SAME PHYS/QHP 5/>YRS: CPT

## 2022-08-09 PROCEDURE — 62323 NJX INTERLAMINAR LMBR/SAC: CPT

## 2023-07-24 ENCOUNTER — HOSPITAL ENCOUNTER (OUTPATIENT)
Age: 68
End: 2023-07-24
Payer: COMMERCIAL

## 2023-07-24 DIAGNOSIS — M47.812: Primary | ICD-10-CM

## 2023-07-24 DIAGNOSIS — M48.02: ICD-10-CM

## 2023-07-24 DIAGNOSIS — M50.30: ICD-10-CM

## 2023-07-24 PROCEDURE — 72050 X-RAY EXAM NECK SPINE 4/5VWS: CPT

## 2023-08-28 ENCOUNTER — HOSPITAL ENCOUNTER (OUTPATIENT)
Age: 68
End: 2023-08-28
Payer: COMMERCIAL

## 2023-08-28 DIAGNOSIS — M17.11: ICD-10-CM

## 2023-08-28 DIAGNOSIS — M71.21: ICD-10-CM

## 2023-08-28 DIAGNOSIS — M25.561: ICD-10-CM

## 2023-08-28 DIAGNOSIS — S83.241A: ICD-10-CM

## 2023-08-28 DIAGNOSIS — M25.461: ICD-10-CM

## 2023-08-28 DIAGNOSIS — S83.271A: Primary | ICD-10-CM

## 2023-08-28 DIAGNOSIS — S83.011A: ICD-10-CM

## 2023-08-28 PROCEDURE — 73721 MRI JNT OF LWR EXTRE W/O DYE: CPT
